# Patient Record
Sex: FEMALE | Race: WHITE | NOT HISPANIC OR LATINO | URBAN - METROPOLITAN AREA
[De-identification: names, ages, dates, MRNs, and addresses within clinical notes are randomized per-mention and may not be internally consistent; named-entity substitution may affect disease eponyms.]

---

## 2017-12-24 ENCOUNTER — ANESTHESIA - HEALTHEAST (OUTPATIENT)
Dept: INTENSIVE CARE | Facility: HOSPITAL | Age: 57
End: 2017-12-24

## 2021-06-15 NOTE — ANESTHESIA PROCEDURE NOTES
Emergent Intubation  Date/Time: 12/24/2017 3:15 PM    Performing CRNA: CATHY SOLIS  Indications: respiratory distress  Route: oral  Technique: fiberoptic assisted  Tube size: 7.5 mm  Tube type: cuffed  Cuff inflated: yes  Level of Difficulty: 1ETT to lip: 22 cm  Tube secured with: ETT guillen  ETCO2 = Yes  Breath sounds: equal  SaO2 %: 94  Comments: CRNA called emergently to floor to intubate for respiratory distress.  Pt's was sitting in bed with O2 mask on.  Pt's airway was assessed and glidescope was prepared.  Pt was medicated with 100 mg propofol and 100 mg anectine and intubation was performed.  Cords were visualized with the glide scope and 7.5 tube was placed UDV.  EtCo2 was confirmed and tube was secured with guillen 22cm at the lip.

## 2021-06-16 PROBLEM — L03.211 FACIAL CELLULITIS: Status: ACTIVE | Noted: 2017-12-24

## 2021-06-16 PROBLEM — T78.3XXA ANGIOEDEMA: Status: ACTIVE | Noted: 2017-12-24
